# Patient Record
Sex: MALE | Race: WHITE | NOT HISPANIC OR LATINO | Employment: OTHER | URBAN - METROPOLITAN AREA
[De-identification: names, ages, dates, MRNs, and addresses within clinical notes are randomized per-mention and may not be internally consistent; named-entity substitution may affect disease eponyms.]

---

## 2020-07-02 ENCOUNTER — OFFICE VISIT (OUTPATIENT)
Dept: FAMILY MEDICINE | Facility: CLINIC | Age: 45
End: 2020-07-02
Payer: COMMERCIAL

## 2020-07-02 VITALS
DIASTOLIC BLOOD PRESSURE: 83 MMHG | OXYGEN SATURATION: 97 % | TEMPERATURE: 98.4 F | SYSTOLIC BLOOD PRESSURE: 122 MMHG | HEART RATE: 109 BPM

## 2020-07-02 DIAGNOSIS — F43.10 POST TRAUMATIC STRESS DISORDER: ICD-10-CM

## 2020-07-02 DIAGNOSIS — M79.2 NEUROPATHIC PAIN: ICD-10-CM

## 2020-07-02 DIAGNOSIS — E66.01 MORBID OBESITY (H): ICD-10-CM

## 2020-07-02 DIAGNOSIS — M79.662 PAIN OF LEFT LOWER LEG: Primary | ICD-10-CM

## 2020-07-02 DIAGNOSIS — Z89.512 STATUS POST BELOW-KNEE AMPUTATION OF LEFT LOWER EXTREMITY (H): ICD-10-CM

## 2020-07-02 DIAGNOSIS — M25.511 CHRONIC RIGHT SHOULDER PAIN: ICD-10-CM

## 2020-07-02 DIAGNOSIS — G89.29 CHRONIC RIGHT SHOULDER PAIN: ICD-10-CM

## 2020-07-02 DIAGNOSIS — F32.A DEPRESSION, UNSPECIFIED DEPRESSION TYPE: ICD-10-CM

## 2020-07-02 DIAGNOSIS — Z72.0 TOBACCO ABUSE: ICD-10-CM

## 2020-07-02 DIAGNOSIS — Z99.3 WHEELCHAIR BOUND: ICD-10-CM

## 2020-07-02 PROCEDURE — 99203 OFFICE O/P NEW LOW 30 MIN: CPT | Performed by: FAMILY MEDICINE

## 2020-07-02 RX ORDER — IBUPROFEN 800 MG/1
800 TABLET, FILM COATED ORAL EVERY 8 HOURS PRN
Qty: 180 TABLET | Refills: 11 | Status: SHIPPED | OUTPATIENT
Start: 2020-07-02 | End: 2023-04-13

## 2020-07-02 RX ORDER — PREDNISONE 20 MG/1
TABLET ORAL
Qty: 10 TABLET | Refills: 0 | Status: SHIPPED | OUTPATIENT
Start: 2020-07-02 | End: 2023-04-13 | Stop reason: SINTOL

## 2020-07-02 SDOH — HEALTH STABILITY: MENTAL HEALTH: HOW OFTEN DO YOU HAVE A DRINK CONTAINING ALCOHOL?: NEVER

## 2020-07-02 ASSESSMENT — PAIN SCALES - GENERAL: PAINLEVEL: EXTREME PAIN (8)

## 2020-07-02 NOTE — PROGRESS NOTES
Subjective     Damián Waite is a 45 year old male who presents to clinic today for the following health issues:    HPI     Patient is here for left leg pain that started 1 week ago. Sharp constant pain. Red swollen and tingling.  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  Comes in as a new patient.  He reports he had amputation back in 1991.    History of posttraumatic stress disorder, he does use a wheelchair instead of his prosthesis.  He comes in today reports for the past week or so he is been getting more pain, more tender at his prostate is area mostly at the knee level.  He has no fever, no chills, he has no drainage or discharges.  Over 3 years ago he had similar problem where he had to have the  Drained.    He also reports of right shoulder pain, he reports he does use his wheelchair.  He reports stiff tender.  Denies neck pain,  Has no numbness or tingling in his fingers or hand.      Patient Active Problem List   Diagnosis     Post traumatic stress disorder     Neuropathic pain     S/P below knee amputation (H)     Depression     Tobacco abuse     Wheelchair bound     Morbid obesity (H)     Past Surgical History:   Procedure Laterality Date     AMPUTATION BELOW KNEE RT/LT Left 1991       Social History     Tobacco Use     Smoking status: Current Every Day Smoker     Smokeless tobacco: Never Used   Substance Use Topics     Alcohol use: Never     Frequency: Never     History reviewed. No pertinent family history.      Current Outpatient Medications   Medication Sig Dispense Refill     Ibuprofen (ADVIL PO)        ibuprofen (ADVIL/MOTRIN) 800 MG tablet Take 1 tablet (800 mg) by mouth every 8 hours as needed for moderate pain 180 tablet 11     predniSONE (DELTASONE) 20 MG tablet 2 tab daily for 5 days 10 tablet 0     No Known Allergies  No lab results found.     Reviewed and updated as needed this visit by Provider         Review of Systems    Constitutional, HEENT, cardiovascular, pulmonary, GI, , musculoskeletal, neuro, skin, endocrine and psych systems are negative, except as otherwise noted.      Objective    There were no vitals taken for this visit.  There is no height or weight on file to calculate BMI.  Physical Exam   GENERAL: healthy, alert and no distress  Sitting on a wheel chair.  NECK: no adenopathy, no asymmetry, masses, or scars and thyroid normal to palpation  RESP: lungs clear to auscultation - no rales, rhonchi or wheezes  CV: regular rate and rhythm, normal S1 S2, no S3 or S4, no murmur, click or rub, no peripheral edema and peripheral pulses strong  ABDOMEN: soft, nontender, no hepatosplenomegaly, no masses and bowel sounds normal  MS: Left below knee amputations. Stump look clean, no discharges no redness,   Tenderness at behind the knee, skin not warm to touch, no open sores.  SKIN: no suspicious lesions or rashes  PSYCH: mentation appears normal, affect normal/bright  Right shoulder exam, tenderness at the posterior aspect, stiff with range of motion.  Hand and finger exam normal.    Diagnostic Test Results:  Labs reviewed in Epic  Orders Placed This Encounter   Procedures     US Lower Extremity Venous Duplex Bilateral           Assessment & Plan     1. Pain of left lower leg  Discussed with patient could be superficial thrombophlebitis.  At this point he was advised with warm compression.  I will obtain a sonogram.  Start ibuprofen 800 mg 3 times daily in addition prednisone.  - predniSONE (DELTASONE) 20 MG tablet; 2 tab daily for 5 days  Dispense: 10 tablet; Refill: 0  - US Lower Extremity Venous Duplex Bilateral; Future  - ibuprofen (ADVIL/MOTRIN) 800 MG tablet; Take 1 tablet (800 mg) by mouth every 8 hours as needed for moderate pain  Dispense: 180 tablet; Refill: 11    2. Chronic right shoulder pain  Likely tendinitis, capsulitis.  Advised with home exercise, started prednisone, and will take as been prescribed.  -  predniSONE (DELTASONE) 20 MG tablet; 2 tab daily for 5 days  Dispense: 10 tablet; Refill: 0  - ibuprofen (ADVIL/MOTRIN) 800 MG tablet; Take 1 tablet (800 mg) by mouth every 8 hours as needed for moderate pain  Dispense: 180 tablet; Refill: 11    3. Post traumatic stress disorder      4. Neuropathic pain      5. Status post below-knee amputation of left lower extremity (H)      6. Depression, unspecified depression type      7. Tobacco abuse  Advised to quit smoking  8. Wheelchair bound      9. Morbid obesity (H)         Tobacco Cessation:   reports that he has been smoking. He has never used smokeless tobacco.  Tobacco Cessation Action Plan: Self help information given to patient        Patient Instructions       Patient Education     Exercises for Shoulder Flexibility: Back Scratch    Improving your flexibility can reduce pain. Stretching exercises also can help increase your range of pain-free motion. Breathe normally when you exercise. Try to use smooth, fluid movements. Never force a stretch.  Note: Follow any special instructions you are given. If you feel pain, stop the exercise. If the pain continues after stopping, call your healthcare provider.    Stand straight, placing the back of your hand on the side you want to stretch flat against your lower back.    Throw one end of a towel over your shoulder. Grab it behind your back with your other hand.    Pull down gently on the towel with your front arm. Let your back arm slide up as high as is comfortable. You ll feel a stretch in your shoulder. Hold the stretch for a few seconds.    Repeat 3 to 5 times. Build up to holding each stretch for 30 to 60 seconds.  For your safety, check with your healthcare provider before starting an exercise program.  Date Last Reviewed: 3/1/2018    1883-2259 [employer]. [address] [place] [address]. All rights reserved. This information is not intended as a substitute for professional medical care.  Always follow your healthcare professional's instructions.           Patient Education     Exercises for Shoulder Flexibility: Wall Walk    Improving your flexibility can reduce pain. Stretching exercises also can help increase your range of pain-free motion. Breathe normally when you exercise. Use smooth, fluid movements.  Note: Follow any special instructions you are given. If you feel pain, stop the exercise. If the pain continues after stopping, call your healthcare provider:    Stand with your shoulder about 2 feet from the wall.    Raise your arm to shoulder level and gently  walk  your fingers up the wall as high as you can.    Hold for a few seconds. Then walk your fingers back down.    Repeat 3 times. Move closer to the wall as you repeat.    Build up to holding each stretch for 30 seconds.  Caution: Do this stretch only if your healthcare provider recommends it. Don t do it when you are first injured.  Date Last Reviewed: 3/1/2018    2829-5016 [employer]. [address] [place] [address]. All rights reserved. This information is not intended as a substitute for professional medical care. Always follow your healthcare professional's instructions.           Patient Education     Shoulder Exercises    To start, sit in a chair with your feet flat on the floor. Your weight should be slightly forward so that you re balanced evenly on your buttocks. Relax your shoulders and keep your head level. Avoid arching your back or rounding your shoulders. Using a chair with arms may help you keep your balance.    Raise your arms, elbows bent, to shoulder height.    Slowly move your forearms together. Hold for 5 seconds.    Return to starting position. Repeat 5 times.  Date Last Reviewed: 3/1/2018    8529-7942 [employer]. [address] [place] [address]. All rights reserved. This information is not intended as a substitute for professional medical care. Always follow your  healthcare professional's instructions.           Patient Education     Shoulder Squeeze Exercise    To start, sit in a chair with your feet flat on the floor. Shift your weight slightly forward to avoid rounding your back. Relax. Keep your ears, shoulders, and hips aligned:    Raise your arms to shoulder height, elbows bent and palms forward.    Move your arms back, squeezing your shoulder blades together.    Hold for 10 seconds. Return to starting position.     Repeat 5 times.   For your safety, check with your healthcare provider before starting an exercise program.   Date Last Reviewed: 11/1/2017 2000-2019 [employer]. [address] [place] [address]. All rights reserved. This information is not intended as a substitute for professional medical care. Always follow your healthcare professional's instructions.           Patient Education     Shoulder Exercises: Internal Rotation    Strengthening exercises help make your injured shoulder more stable. To warm up, do flexibility (stretching) exercises first. Your healthcare provider will tell you what size hand weights to use for the strengthening exercise below. If you don t have hand weights, try using cans of soup instead:    With knees bent, lie on a firm surface. Using the hand on the same side as your injured shoulder, grasp a weight. Bend that arm to a right angle (90 degrees).    Rest your elbow on the floor.    Keeping your elbow next to your side, lower your forearm toward the floor, away from your body. Don't lower your hand all the way to the floor.    Slowly return your forearm to your side. Repeat.    Work up to 5 to 15 lifts.  Note: Support your head and neck with a pillow.  Date Last Reviewed: 3/1/2018    2647-8798 [employer]. [address] [place] [address]. All rights reserved. This information is not intended as a substitute for professional medical care. Always follow your healthcare  professional's instructions.           Patient Education     Shoulder Exercises: Shoulder Press    This exercise stretches and strengthens your shoulders. Before starting, read through all the instructions. During the exercise, breathe normally and use smooth movements. Stop if you feel any pain. If pain persists, call your healthcare provider.    Hold a ____ pound weight in each hand, elbows at shoulder level, palms facing forward. Arms to the side and slightly forward. Ask your physical therapist how much weight to use.     Raise one arm up until it s almost straight. Hold for a second. Lower the weight, extending the other arm up.    Repeat ____ times with each arm. Do ____ sets ____ times a day.  CAUTION: If you have shoulder problems, consult your healthcare provider before doing this exercise. Keep your head and body still during the exercise. Only your arms should move.   Date Last Reviewed: 11/1/2017 2000-2019 [employer]. [address] [place] [address], [place] [address]. All rights reserved. This information is not intended as a substitute for professional medical care. Always follow your healthcare professional's instructions.               No follow-ups on file.    Marquise Juarez MD  [facility]

## 2020-07-02 NOTE — PATIENT INSTRUCTIONS
Patient Education     Exercises for Shoulder Flexibility: Back Scratch    Improving your flexibility can reduce pain. Stretching exercises also can help increase your range of pain-free motion. Breathe normally when you exercise. Try to use smooth, fluid movements. Never force a stretch.  Note: Follow any special instructions you are given. If you feel pain, stop the exercise. If the pain continues after stopping, call your healthcare provider.    Stand straight, placing the back of your hand on the side you want to stretch flat against your lower back.    Throw one end of a towel over your shoulder. Grab it behind your back with your other hand.    Pull down gently on the towel with your front arm. Let your back arm slide up as high as is comfortable. You ll feel a stretch in your shoulder. Hold the stretch for a few seconds.    Repeat 3 to 5 times. Build up to holding each stretch for 30 to 60 seconds.  For your safety, check with your healthcare provider before starting an exercise program.  Date Last Reviewed: 3/1/2018    6113-0403 [employer] [address] [place] [address]. All rights reserved. This information is not intended as a substitute for professional medical care. Always follow your healthcare professional's instructions.           Patient Education     Exercises for Shoulder Flexibility: Wall Walk    Improving your flexibility can reduce pain. Stretching exercises also can help increase your range of pain-free motion. Breathe normally when you exercise. Use smooth, fluid movements.  Note: Follow any special instructions you are given. If you feel pain, stop the exercise. If the pain continues after stopping, call your healthcare provider:    Stand with your shoulder about 2 feet from the wall.    Raise your arm to shoulder level and gently  walk  your fingers up the wall as high as you can.    Hold for a few seconds. Then walk your fingers back down.    Repeat 3 times. Move  closer to the wall as you repeat.    Build up to holding each stretch for 30 seconds.  Caution: Do this stretch only if your healthcare provider recommends it. Don t do it when you are first injured.  Date Last Reviewed: 3/1/2018    4038-8767 [employer]. [address] [place] [address]. All rights reserved. This information is not intended as a substitute for professional medical care. Always follow your healthcare professional's instructions.           Patient Education     Shoulder Exercises    To start, sit in a chair with your feet flat on the floor. Your weight should be slightly forward so that you re balanced evenly on your buttocks. Relax your shoulders and keep your head level. Avoid arching your back or rounding your shoulders. Using a chair with arms may help you keep your balance.    Raise your arms, elbows bent, to shoulder height.    Slowly move your forearms together. Hold for 5 seconds.    Return to starting position. Repeat 5 times.  Date Last Reviewed: 3/1/2018    [id number] [facility]. [address] [place] [address]. All rights reserved. This information is not intended as a substitute for professional medical care. Always follow your healthcare professional's instructions.           Patient Education     Shoulder Squeeze Exercise    To start, sit in a chair with your feet flat on the floor. Shift your weight slightly forward to avoid rounding your back. Relax. Keep your ears, shoulders, and hips aligned:    Raise your arms to shoulder height, elbows bent and palms forward.    Move your arms back, squeezing your shoulder blades together.    Hold for 10 seconds. Return to starting position.     Repeat 5 times.   For your safety, check with your healthcare provider before starting an exercise program.   Date Last Reviewed: 11/1/2017 2000-2019 [employer]. [address] [place] [address]. All rights reserved. This  information is not intended as a substitute for professional medical care. Always follow your healthcare professional's instructions.           Patient Education     Shoulder Exercises: Internal Rotation    Strengthening exercises help make your injured shoulder more stable. To warm up, do flexibility (stretching) exercises first. Your healthcare provider will tell you what size hand weights to use for the strengthening exercise below. If you don t have hand weights, try using cans of soup instead:    With knees bent, lie on a firm surface. Using the hand on the same side as your injured shoulder, grasp a weight. Bend that arm to a right angle (90 degrees).    Rest your elbow on the floor.    Keeping your elbow next to your side, lower your forearm toward the floor, away from your body. Don't lower your hand all the way to the floor.    Slowly return your forearm to your side. Repeat.    Work up to 5 to 15 lifts.  Note: Support your head and neck with a pillow.  Date Last Reviewed: 3/1/2018    8242-3431 [employer]. [address] [place] [address]. All rights reserved. This information is not intended as a substitute for professional medical care. Always follow your healthcare professional's instructions.           Patient Education     Shoulder Exercises: Shoulder Press    This exercise stretches and strengthens your shoulders. Before starting, read through all the instructions. During the exercise, breathe normally and use smooth movements. Stop if you feel any pain. If pain persists, call your healthcare provider.    Hold a ____ pound weight in each hand, elbows at shoulder level, palms facing forward. Arms to the side and slightly forward. Ask your physical therapist how much weight to use.     Raise one arm up until it s almost straight. Hold for a second. Lower the weight, extending the other arm up.    Repeat ____ times with each arm. Do ____ sets ____ times a day.  CAUTION: If you  have shoulder problems, consult your healthcare provider before doing this exercise. Keep your head and body still during the exercise. Only your arms should move.   Date Last Reviewed: 11/1/2017 2000-2019 [employer]. [address] [place] [address], [place] [address]. All rights reserved. This information is not intended as a substitute for professional medical care. Always follow your healthcare professional's instructions.

## 2020-07-06 ENCOUNTER — TELEPHONE (OUTPATIENT)
Dept: FAMILY MEDICINE | Facility: CLINIC | Age: 45
End: 2020-07-06

## 2020-07-06 ENCOUNTER — ANCILLARY PROCEDURE (OUTPATIENT)
Dept: ULTRASOUND IMAGING | Facility: CLINIC | Age: 45
End: 2020-07-06
Attending: FAMILY MEDICINE
Payer: COMMERCIAL

## 2020-07-06 DIAGNOSIS — M79.662 PAIN OF LEFT LOWER LEG: ICD-10-CM

## 2020-07-06 DIAGNOSIS — M79.662 PAIN OF LEFT LOWER LEG: Primary | ICD-10-CM

## 2020-07-06 DIAGNOSIS — L03.116 CELLULITIS OF LEFT LOWER EXTREMITY: ICD-10-CM

## 2020-07-06 PROCEDURE — 93971 EXTREMITY STUDY: CPT | Mod: LT

## 2020-07-06 RX ORDER — CLINDAMYCIN HCL 300 MG
300 CAPSULE ORAL 3 TIMES DAILY
Qty: 30 CAPSULE | Refills: 0 | Status: SHIPPED | OUTPATIENT
Start: 2020-07-06 | End: 2020-07-16

## 2021-06-06 NOTE — TELEPHONE ENCOUNTER
Call patient regarding his sonogram, was negative for DVT.    He reports still feels warm, tender to touch.  I did send him prescription for clindamycin 1 tablet 3 times daily.  Take with food   Yes

## 2022-02-21 ENCOUNTER — OFFICE VISIT (OUTPATIENT)
Dept: FAMILY MEDICINE | Facility: CLINIC | Age: 47
End: 2022-02-21
Payer: COMMERCIAL

## 2022-02-21 VITALS
BODY MASS INDEX: 35.48 KG/M2 | RESPIRATION RATE: 26 BRPM | HEIGHT: 71 IN | DIASTOLIC BLOOD PRESSURE: 82 MMHG | OXYGEN SATURATION: 98 % | WEIGHT: 253.4 LBS | TEMPERATURE: 98.5 F | HEART RATE: 114 BPM | SYSTOLIC BLOOD PRESSURE: 130 MMHG

## 2022-02-21 DIAGNOSIS — L03.116 CELLULITIS OF LEFT LOWER EXTREMITY: Primary | ICD-10-CM

## 2022-02-21 DIAGNOSIS — Z89.512 STATUS POST BELOW-KNEE AMPUTATION OF LEFT LOWER EXTREMITY (H): ICD-10-CM

## 2022-02-21 PROBLEM — T84.069A: Status: ACTIVE | Noted: 2022-02-21

## 2022-02-21 PROCEDURE — 99213 OFFICE O/P EST LOW 20 MIN: CPT | Performed by: FAMILY MEDICINE

## 2022-02-21 RX ORDER — PREGABALIN 25 MG/1
1 CAPSULE ORAL 3 TIMES DAILY PRN
COMMUNITY
Start: 2022-02-16 | End: 2023-04-13

## 2022-02-21 RX ORDER — DOXYCYCLINE 100 MG/1
100 CAPSULE ORAL 2 TIMES DAILY
Qty: 20 CAPSULE | Refills: 0 | Status: SHIPPED | OUTPATIENT
Start: 2022-02-21 | End: 2023-04-13

## 2022-02-21 ASSESSMENT — PAIN SCALES - GENERAL: PAINLEVEL: SEVERE PAIN (6)

## 2022-02-21 NOTE — PROGRESS NOTES
"  Assessment & Plan     Cellulitis of left lower extremity  Patient reports she was seen by an orthopedic, there was no abscess or skin infection, bone infection.  Advised patient supportive care, started doxycycline.  Advised with supportive care.  - doxycycline hyclate (VIBRAMYCIN) 100 MG capsule; Take 1 capsule (100 mg) by mouth 2 times daily    Status post below-knee amputation of left lower extremity (H)  Patient does wear prosthesis in his left lower leg.  He does follow-up with prosthesis clinic, where he did his leg adjusted.  Continue with Lyrica, was started 3 days ago.  May increase the dose to 50 mg bid.    No follow-ups on file.    Marquise Juarez MD  [facility]    Luisana Steel is a 46 year old who presents for the following health issues  History of Present Illness     Reason for visit:  Today l am here because l have a leg pain.  Symptom onset:  1-2 weeks ago  Symptoms include:  Pain  Symptom intensity:  Mild  Symptom progression:  Staying the same  Had these symptoms before:  Yes  Has tried/received treatment for these symptoms:  No  What makes it worse:  No  What makes it better:  For my leg to be better not hurt no more.    He eats 0-1 servings of fruits and vegetables daily.He consumes 2 sweetened beverage(s) daily.He exercises with enough effort to increase his heart rate 9 or less minutes per day.  He exercises with enough effort to increase his heart rate 3 or less days per week. He is missing 2 dose(s) of medications per week.       Review of Systems   Constitutional, HEENT, cardiovascular, pulmonary, gi and gu systems are negative, except as otherwise noted.      Objective    /82 (BP Location: Right arm, Patient Position: Sitting, Cuff Size: Adult Large)   Pulse 114   Temp 98.5  F (36.9  C)   Resp 26   Ht 1.803 m (5' 11\")   Wt 114.9 kg (253 lb 6.4 oz)   SpO2 98%   BMI 35.34 kg/m    Body mass index is 35.34 kg/m .  Physical Exam   GENERAL: " healthy, alert and no distress  MS: left leg : Amputation below the knee.  Knee stump more red, he does have a clear small blister.  No discharges.    NEURO: Normal strength and tone, mentation intact and speech normal  PSYCH: mentation appears normal, affect normal/bright      Marquise Juarez MD

## 2023-04-13 ENCOUNTER — OFFICE VISIT (OUTPATIENT)
Dept: FAMILY MEDICINE | Facility: CLINIC | Age: 48
End: 2023-04-13
Payer: COMMERCIAL

## 2023-04-13 VITALS
TEMPERATURE: 98.4 F | DIASTOLIC BLOOD PRESSURE: 78 MMHG | SYSTOLIC BLOOD PRESSURE: 127 MMHG | RESPIRATION RATE: 20 BRPM | HEART RATE: 88 BPM | BODY MASS INDEX: 32.2 KG/M2 | HEIGHT: 71 IN | WEIGHT: 230 LBS | OXYGEN SATURATION: 97 %

## 2023-04-13 DIAGNOSIS — G56.01 CARPAL TUNNEL SYNDROME OF RIGHT WRIST: Primary | ICD-10-CM

## 2023-04-13 DIAGNOSIS — I83.811 VARICOSE VEINS OF LOWER EXTREMITY WITH PAIN, RIGHT: ICD-10-CM

## 2023-04-13 DIAGNOSIS — M79.10 MUSCLE PAIN: ICD-10-CM

## 2023-04-13 DIAGNOSIS — Z11.4 SCREENING FOR HIV (HUMAN IMMUNODEFICIENCY VIRUS): ICD-10-CM

## 2023-04-13 DIAGNOSIS — M25.50 MULTIPLE JOINT PAIN: ICD-10-CM

## 2023-04-13 DIAGNOSIS — M77.11 RIGHT TENNIS ELBOW: ICD-10-CM

## 2023-04-13 DIAGNOSIS — Z11.59 NEED FOR HEPATITIS C SCREENING TEST: ICD-10-CM

## 2023-04-13 LAB
BASOPHILS # BLD AUTO: 0 10E3/UL (ref 0–0.2)
BASOPHILS NFR BLD AUTO: 0 %
EOSINOPHIL # BLD AUTO: 0.2 10E3/UL (ref 0–0.7)
EOSINOPHIL NFR BLD AUTO: 2 %
ERYTHROCYTE [DISTWIDTH] IN BLOOD BY AUTOMATED COUNT: 12.7 % (ref 10–15)
ERYTHROCYTE [SEDIMENTATION RATE] IN BLOOD BY WESTERGREN METHOD: 7 MM/HR (ref 0–15)
HCT VFR BLD AUTO: 43.4 % (ref 40–53)
HGB BLD-MCNC: 15 G/DL (ref 13.3–17.7)
HOLD SPECIMEN: NORMAL
LYMPHOCYTES # BLD AUTO: 2.8 10E3/UL (ref 0.8–5.3)
LYMPHOCYTES NFR BLD AUTO: 34 %
MCH RBC QN AUTO: 31.1 PG (ref 26.5–33)
MCHC RBC AUTO-ENTMCNC: 34.6 G/DL (ref 31.5–36.5)
MCV RBC AUTO: 90 FL (ref 78–100)
MONOCYTES # BLD AUTO: 0.7 10E3/UL (ref 0–1.3)
MONOCYTES NFR BLD AUTO: 9 %
NEUTROPHILS # BLD AUTO: 4.5 10E3/UL (ref 1.6–8.3)
NEUTROPHILS NFR BLD AUTO: 55 %
PLATELET # BLD AUTO: 255 10E3/UL (ref 150–450)
RBC # BLD AUTO: 4.82 10E6/UL (ref 4.4–5.9)
WBC # BLD AUTO: 8.1 10E3/UL (ref 4–11)

## 2023-04-13 PROCEDURE — 86803 HEPATITIS C AB TEST: CPT | Performed by: FAMILY MEDICINE

## 2023-04-13 PROCEDURE — 86618 LYME DISEASE ANTIBODY: CPT | Mod: 90 | Performed by: FAMILY MEDICINE

## 2023-04-13 PROCEDURE — 86038 ANTINUCLEAR ANTIBODIES: CPT | Performed by: FAMILY MEDICINE

## 2023-04-13 PROCEDURE — 82607 VITAMIN B-12: CPT | Performed by: FAMILY MEDICINE

## 2023-04-13 PROCEDURE — 82495 ASSAY OF CHROMIUM: CPT | Mod: 90 | Performed by: FAMILY MEDICINE

## 2023-04-13 PROCEDURE — 99215 OFFICE O/P EST HI 40 MIN: CPT | Performed by: FAMILY MEDICINE

## 2023-04-13 PROCEDURE — 82550 ASSAY OF CK (CPK): CPT | Performed by: FAMILY MEDICINE

## 2023-04-13 PROCEDURE — 86431 RHEUMATOID FACTOR QUANT: CPT | Performed by: FAMILY MEDICINE

## 2023-04-13 PROCEDURE — 85652 RBC SED RATE AUTOMATED: CPT | Performed by: FAMILY MEDICINE

## 2023-04-13 PROCEDURE — 87389 HIV-1 AG W/HIV-1&-2 AB AG IA: CPT | Mod: 90 | Performed by: FAMILY MEDICINE

## 2023-04-13 PROCEDURE — 86140 C-REACTIVE PROTEIN: CPT | Performed by: FAMILY MEDICINE

## 2023-04-13 PROCEDURE — 80050 GENERAL HEALTH PANEL: CPT | Performed by: FAMILY MEDICINE

## 2023-04-13 PROCEDURE — 82085 ASSAY OF ALDOLASE: CPT | Mod: 90 | Performed by: FAMILY MEDICINE

## 2023-04-13 PROCEDURE — 82306 VITAMIN D 25 HYDROXY: CPT | Performed by: FAMILY MEDICINE

## 2023-04-13 PROCEDURE — 99000 SPECIMEN HANDLING OFFICE-LAB: CPT | Performed by: FAMILY MEDICINE

## 2023-04-13 PROCEDURE — 83018 HEAVY METAL QUAN EACH NES: CPT | Mod: 90 | Performed by: FAMILY MEDICINE

## 2023-04-13 PROCEDURE — 86200 CCP ANTIBODY: CPT | Performed by: FAMILY MEDICINE

## 2023-04-13 PROCEDURE — 36415 COLL VENOUS BLD VENIPUNCTURE: CPT | Performed by: FAMILY MEDICINE

## 2023-04-13 RX ORDER — CYCLOBENZAPRINE HCL 5 MG
5 TABLET ORAL
Qty: 30 TABLET | Refills: 0 | Status: SHIPPED | OUTPATIENT
Start: 2023-04-13 | End: 2023-05-13

## 2023-04-13 RX ORDER — METHYLPREDNISOLONE 4 MG
TABLET, DOSE PACK ORAL
Qty: 21 TABLET | Refills: 0 | Status: SHIPPED | OUTPATIENT
Start: 2023-04-13 | End: 2023-12-06

## 2023-04-13 RX ORDER — NABUMETONE 500 MG/1
500 TABLET, FILM COATED ORAL 2 TIMES DAILY PRN
Qty: 60 TABLET | Refills: 3 | Status: SHIPPED | OUTPATIENT
Start: 2023-04-13 | End: 2023-12-06

## 2023-04-13 ASSESSMENT — PAIN SCALES - GENERAL: PAINLEVEL: MODERATE PAIN (5)

## 2023-04-13 ASSESSMENT — PATIENT HEALTH QUESTIONNAIRE - PHQ9
10. IF YOU CHECKED OFF ANY PROBLEMS, HOW DIFFICULT HAVE THESE PROBLEMS MADE IT FOR YOU TO DO YOUR WORK, TAKE CARE OF THINGS AT HOME, OR GET ALONG WITH OTHER PEOPLE: NOT DIFFICULT AT ALL
SUM OF ALL RESPONSES TO PHQ QUESTIONS 1-9: 2
SUM OF ALL RESPONSES TO PHQ QUESTIONS 1-9: 2

## 2023-04-13 NOTE — PROGRESS NOTES
Assessment & Plan     Carpal tunnel syndrome of right wrist  Patient reports for the past 5 to 6 months he has been having more joint stiffness, more muscle pain.  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  Using his hand all the time.  He reports he gets more stiffness more weakness on the right-hand.  He has no joint swelling, he has no loss of sensation.  He does work using chemicals to clean cars and detail cars.  Does not wear gloves.  Patient complaining of multiple stiffness in his hand, his wrists, his muscles, he reported does get better if he is not doing anything.  Has been taking vitamin D 50,000 unit once a week.  He has no fever, no chills, no swelling in his joint, no skin rashes.    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  He does use chemicals to clean cars, without using gloves.  Advised patient to wears gloves.  Advised patient supportive care.    He does have symptoms of right carpal tunnel I gave him a cock-up splint to use during the daytime.  Advised patient with home exercise, stretches and exercise and abdominal lower extremities.  In addition, drinks more water.  Continue with vitamin D supplement, 50, 000 units a week    - Wrist/Arm Supplies Order Wrist Brace; Right; with thumb spica  - methylPREDNISolone (MEDROL DOSEPAK) 4 MG tablet therapy pack; Follow Package Directions  - cyclobenzaprine (FLEXERIL) 5 MG tablet; Take 1 tablet (5 mg) by mouth nightly as needed for muscle spasms  - nabumetone (RELAFEN) 500 MG tablet; Take 1 tablet (500 mg) by mouth 2 times daily as needed for moderate pain  - TSH with free T4 reflex; Future  - TSH with free T4 reflex    Multiple joint pain    - CRP, inflammation; Future  - ESR: Erythrocyte sedimentation rate; Future  - CBC with platelets and differential; Future  - Vitamin D Deficiency; Future  - Comprehensive metabolic panel (BMP + Alb, Alk Phos, ALT, AST,  Total. Bili, TP); Future  - Lyme Disease Total Abs Bld with Reflex to Confirm CLIA; Future  - Rheumatoid factor; Future  - Cyclic Citrullinated Peptide Antibody IgG; Future  - Vitamin B12; Future  - Vitamin D Deficiency; Future  - CK total; Future  - methylPREDNISolone (MEDROL DOSEPAK) 4 MG tablet therapy pack; Follow Package Directions  - cyclobenzaprine (FLEXERIL) 5 MG tablet; Take 1 tablet (5 mg) by mouth nightly as needed for muscle spasms  - nabumetone (RELAFEN) 500 MG tablet; Take 1 tablet (500 mg) by mouth 2 times daily as needed for moderate pain  - Mercury, blood; Future  - Cobalt; Future  - Chromium level; Future  - CRP, inflammation  - ESR: Erythrocyte sedimentation rate  - CBC with platelets and differential  - Vitamin D Deficiency  - Comprehensive metabolic panel (BMP + Alb, Alk Phos, ALT, AST, Total. Bili, TP)  - Lyme Disease Total Abs Bld with Reflex to Confirm CLIA  - Rheumatoid factor  - Cyclic Citrullinated Peptide Antibody IgG  - Vitamin B12  - CK total  - Mercury, blood  - Cobalt  - Chromium level    Muscle pain    - CRP, inflammation; Future  - ESR: Erythrocyte sedimentation rate; Future  - CBC with platelets and differential; Future  - Vitamin D Deficiency; Future  - Comprehensive metabolic panel (BMP + Alb, Alk Phos, ALT, AST, Total. Bili, TP); Future  - Lyme Disease Total Abs Bld with Reflex to Confirm CLIA; Future  - Rheumatoid factor; Future  - Cyclic Citrullinated Peptide Antibody IgG; Future  - Vitamin B12; Future  - Vitamin D Deficiency; Future  - CK total; Future  - methylPREDNISolone (MEDROL DOSEPAK) 4 MG tablet therapy pack; Follow Package Directions  - cyclobenzaprine (FLEXERIL) 5 MG tablet; Take 1 tablet (5 mg) by mouth nightly as needed for muscle spasms  - nabumetone (RELAFEN) 500 MG tablet; Take 1 tablet (500 mg) by mouth 2 times daily as needed for moderate pain  - TSH with free T4 reflex; Future  - Lead Venous Blood Confirm; Future  - Mercury, blood; Future  - Cobalt; Future  -  "Chromium level; Future  - CRP, inflammation  - ESR: Erythrocyte sedimentation rate  - CBC with platelets and differential  - Vitamin D Deficiency  - Comprehensive metabolic panel (BMP + Alb, Alk Phos, ALT, AST, Total. Bili, TP)  - Lyme Disease Total Abs Bld with Reflex to Confirm CLIA  - Rheumatoid factor  - Cyclic Citrullinated Peptide Antibody IgG  - Vitamin B12  - CK total  - TSH with free T4 reflex  - Lead Venous Blood Confirm  - Mercury, blood  - Cobalt  - Chromium level    Screening for HIV (human immunodeficiency virus)  screening    Need for hepatitis C screening test  screening    Varicose veins of lower extremity with pain, right    - Compression Sleeve/Stocking Order for DME - ONLY FOR DME    Right tennis elbow  Right hand cockup splint  Advised with home daily stretches.        Over 45 minutes spent reviewing chart, reviewing test results, talking with and examining patient, formulating plan, and documentation on the day of the encounter.       Nicotine/Tobacco Cessation:  He reports that he has been smoking cigarettes. He has a 1.25 pack-year smoking history. He has been exposed to tobacco smoke. He has never used smokeless tobacco.  Nicotine/Tobacco Cessation Plan:   advised pt to quite, not ready      BMI:   Estimated body mass index is 32.08 kg/m  as calculated from the following:    Height as of this encounter: 1.803 m (5' 11\").    Weight as of this encounter: 104.3 kg (230 lb).   Weight management plan: Discussed healthy diet and exercise guidelines    Work on weight loss  Regular exercise      Luisana Steel is a 48 year old, presenting for the following health issues:  Generalized Body Aches  Patient reports for the past 5 to 6 months he has been having more joint stiffness, more muscle pain.  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  Using his hand all the time.  He reports he gets more stiffness more weakness on the right-hand.  He has no joint swelling, he has no loss of " "sensation.  He does work using chemicals to clean cars and detail cars.  Does not wear gloves.  Patient complaining of multiple stiffness in his hand, his wrists, his muscle, he reported does get better if he is not doing anything.  Has been taking vitamin D 50,000 unit once a week.  He has no fever, no chills, no swelling in his joint, no skin rashes.      4/13/2023     4:25 PM   Additional Questions   Roomed by Zhanna Cervantse   Accompanied by Daughter         4/13/2023     4:25 PM   Patient Reported Additional Medications   Patient reports taking the following new medications No     History of Present Illness       Reason for visit:  Pain in my bones  Symptom onset:  More than a month  Symptoms include:  Pain the the bones  Symptom intensity:  Moderate  Symptom progression:  Staying the same  Had these symptoms before:  No  What makes it worse:  No  What makes it better:  Family and being healthy    He eats 4 or more servings of fruits and vegetables daily.He consumes 3 sweetened beverage(s) daily.He exercises with enough effort to increase his heart rate 20 to 29 minutes per day.  He exercises with enough effort to increase his heart rate 3 or less days per week.   He is taking medications regularly.    Today's PHQ-9         PHQ-9 Total Score: 2    PHQ-9 Q9 Thoughts of better off dead/self-harm past 2 weeks :   Not at all    How difficult have these problems made it for you to do your work, take care of things at home, or get along with other people: Not difficult at all       Review of Systems   Constitutional, HEENT, cardiovascular, pulmonary, GI, , musculoskeletal, neuro, skin, endocrine and psych systems are negative, except as otherwise noted.      Objective    /78 (BP Location: Right arm, Patient Position: Chair, Cuff Size: Adult Large)   Pulse 88   Temp 98.4  F (36.9  C) (Tympanic)   Resp 20   Ht 1.803 m (5' 11\")   Wt 104.3 kg (230 lb)   SpO2 97%   BMI 32.08 kg/m    Body mass index is 32.08 " kg/m .  Physical Exam   GENERAL: healthy, alert and no distress  NECK: no adenopathy, no asymmetry, masses, or scars and thyroid normal to palpation  RESP: lungs clear to auscultation - no rales, rhonchi or wheezes  CV: regular rate and rhythm, normal S1 S2, no S3 or S4, no murmur, click or rub, no peripheral edema and peripheral pulses strong  ABDOMEN: soft, nontender, no hepatosplenomegaly, no masses and bowel sounds normal  MS: no gross musculoskeletal defects noted, no edema  No joint swelling or tenderness.  Right lower leg: varicose veins.  No skin rashes.  SKIN: no suspicious lesions or rashes  NEURO: Normal strength and tone, mentation intact and speech normal  PSYCH: mentation appears normal, affect normal/bright    Orders Placed This Encounter   Procedures     REVIEW OF HEALTH MAINTENANCE PROTOCOL ORDERS     CRP, inflammation     ESR: Erythrocyte sedimentation rate     Vitamin D Deficiency     Comprehensive metabolic panel (BMP + Alb, Alk Phos, ALT, AST, Total. Bili, TP)     Lyme Disease Total Abs Bld with Reflex to Confirm CLIA     Rheumatoid factor     Cyclic Citrullinated Peptide Antibody IgG     Vitamin B12     Vitamin D Deficiency     CK total     TSH with free T4 reflex     Extra Tube     Lead Venous Blood Confirm     Mercury, blood     Cobalt     Chromium level     CBC with platelets and differential     Extra Red Top Tube     Extra Green Top (Lithium Heparin) Tube     Extra Purple Top Tube     Compression Sleeve/Stocking Order for DME - ONLY FOR DME     Wrist/Arm Supplies Order Wrist Brace; Right; non-thumb spica     CBC with platelets and differential           Marquise Juarez MD

## 2023-04-14 LAB
ALBUMIN SERPL BCG-MCNC: 4.3 G/DL (ref 3.5–5.2)
ALP SERPL-CCNC: 66 U/L (ref 40–129)
ALT SERPL W P-5'-P-CCNC: 24 U/L (ref 10–50)
ANION GAP SERPL CALCULATED.3IONS-SCNC: 11 MMOL/L (ref 7–15)
AST SERPL W P-5'-P-CCNC: 29 U/L (ref 10–50)
BILIRUB SERPL-MCNC: 0.3 MG/DL
BUN SERPL-MCNC: 17.3 MG/DL (ref 6–20)
CALCIUM SERPL-MCNC: 9.4 MG/DL (ref 8.6–10)
CHLORIDE SERPL-SCNC: 105 MMOL/L (ref 98–107)
CK SERPL-CCNC: 239 U/L (ref 39–308)
CREAT SERPL-MCNC: 0.94 MG/DL (ref 0.67–1.17)
CRP SERPL-MCNC: <3 MG/L
DEPRECATED HCO3 PLAS-SCNC: 23 MMOL/L (ref 22–29)
GFR SERPL CREATININE-BSD FRML MDRD: >90 ML/MIN/1.73M2
GLUCOSE SERPL-MCNC: 77 MG/DL (ref 70–99)
POTASSIUM SERPL-SCNC: 4.2 MMOL/L (ref 3.4–5.3)
PROT SERPL-MCNC: 6.9 G/DL (ref 6.4–8.3)
SODIUM SERPL-SCNC: 139 MMOL/L (ref 136–145)
TSH SERPL DL<=0.005 MIU/L-ACNC: 0.68 UIU/ML (ref 0.3–4.2)
VIT B12 SERPL-MCNC: 233 PG/ML (ref 232–1245)

## 2023-04-15 LAB — ALDOLASE SERPL-CCNC: 3.5 U/L

## 2023-04-16 LAB
COBALT SERPL-MCNC: <1 UG/L
CR SERPL-MCNC: <1 UG/L

## 2023-04-17 ENCOUNTER — TELEPHONE (OUTPATIENT)
Dept: FAMILY MEDICINE | Facility: CLINIC | Age: 48
End: 2023-04-17
Payer: COMMERCIAL

## 2023-04-17 DIAGNOSIS — M79.10 MUSCLE PAIN: Primary | ICD-10-CM

## 2023-04-17 DIAGNOSIS — M25.50 MULTIPLE JOINT PAIN: ICD-10-CM

## 2023-04-17 LAB
ANA SER QL IF: NEGATIVE
B BURGDOR IGG+IGM SER QL: 0.19
CCP AB SER IA-ACNC: 2.2 U/ML
DEPRECATED CALCIDIOL+CALCIFEROL SERPL-MC: 27 UG/L (ref 20–75)
HCV AB SERPL QL IA: NONREACTIVE
HIV 1+2 AB+HIV1 P24 AG SERPL QL IA: NONREACTIVE
RHEUMATOID FACT SER NEPH-ACNC: <7 IU/ML

## 2023-04-17 NOTE — TELEPHONE ENCOUNTER
----- Message from Marquise Juarez MD sent at 4/17/2023  5:10 PM CDT -----  Please call patient with his results.  All his labs come back normal.  No sign of muscle injury, no sign of inflammation, no sign of joint inflammation.  No sign of rheumatoid arthritis.    His vitamin D, 27.  Have patient continue with his vitamin D, 1 tablet every week.  Otherwise, no inflammation, no infection, rheumatoid disease.  In addition his metals screen came back normal.    Advised patient supportive care.  Advised patient to wear gloves every time he is working with chemicals, to prevent dryness in his hand, and elastic shocks and lotions hand with Eucerin or Aveeno locations.    He may consider to PT to help with his muscle pain.    thanks

## 2023-04-17 NOTE — TELEPHONE ENCOUNTER
Attempt #1 to call patient.     RN used interpretor services to leave VM    RN left voicemail and requested return call to [facility] at [phone].     Chinyere Bonds RN, BSN  [facility]: [place]

## 2023-04-19 NOTE — TELEPHONE ENCOUNTER
RN called patient/family and relayed provider's message. Patient/family verbalized understanding.     RN used interpretor services to relay message.    Chinyere Bonds RN, BSN  [facility]: [place]

## 2023-11-16 ENCOUNTER — TELEPHONE (OUTPATIENT)
Dept: NURSING | Facility: CLINIC | Age: 48
End: 2023-11-16
Payer: COMMERCIAL

## 2023-11-16 NOTE — TELEPHONE ENCOUNTER
Patient's daughter calling with her Dad. Writer was trying to get an  on the phone and the daughter hung up. No triage.     KAMALJIT STRANGE RN

## 2023-12-06 ENCOUNTER — OFFICE VISIT (OUTPATIENT)
Dept: FAMILY MEDICINE | Facility: CLINIC | Age: 48
End: 2023-12-06
Payer: COMMERCIAL

## 2023-12-06 VITALS
TEMPERATURE: 98 F | HEIGHT: 68 IN | RESPIRATION RATE: 26 BRPM | SYSTOLIC BLOOD PRESSURE: 132 MMHG | DIASTOLIC BLOOD PRESSURE: 87 MMHG | BODY MASS INDEX: 36.37 KG/M2 | HEART RATE: 102 BPM | WEIGHT: 240 LBS | OXYGEN SATURATION: 97 %

## 2023-12-06 DIAGNOSIS — Z72.0 TOBACCO ABUSE: ICD-10-CM

## 2023-12-06 DIAGNOSIS — S83.101A KNEE SUBLUXATION, RIGHT, INITIAL ENCOUNTER: Primary | ICD-10-CM

## 2023-12-06 DIAGNOSIS — Z89.512 STATUS POST BELOW-KNEE AMPUTATION OF LEFT LOWER EXTREMITY (H): ICD-10-CM

## 2023-12-06 DIAGNOSIS — M79.10 MUSCLE PAIN: ICD-10-CM

## 2023-12-06 DIAGNOSIS — E66.01 MORBID OBESITY (H): ICD-10-CM

## 2023-12-06 PROCEDURE — 99214 OFFICE O/P EST MOD 30 MIN: CPT | Mod: 25 | Performed by: FAMILY MEDICINE

## 2023-12-06 PROCEDURE — 90686 IIV4 VACC NO PRSV 0.5 ML IM: CPT | Performed by: FAMILY MEDICINE

## 2023-12-06 PROCEDURE — 90471 IMMUNIZATION ADMIN: CPT | Performed by: FAMILY MEDICINE

## 2023-12-06 RX ORDER — DICLOFENAC SODIUM 75 MG/1
75 TABLET, DELAYED RELEASE ORAL 2 TIMES DAILY PRN
Qty: 60 TABLET | Refills: 1 | Status: SHIPPED | OUTPATIENT
Start: 2023-12-06 | End: 2024-02-08

## 2023-12-06 RX ORDER — METHYLPREDNISOLONE 4 MG
TABLET, DOSE PACK ORAL
Qty: 21 TABLET | Refills: 0 | Status: SHIPPED | OUTPATIENT
Start: 2023-12-06

## 2023-12-06 RX ORDER — PHENTERMINE HYDROCHLORIDE 37.5 MG/1
37.5 TABLET ORAL
Qty: 30 TABLET | Refills: 0 | Status: SHIPPED | OUTPATIENT
Start: 2023-12-06

## 2023-12-06 RX ORDER — DICLOFENAC SODIUM 75 MG/1
75 TABLET, DELAYED RELEASE ORAL 2 TIMES DAILY PRN
Qty: 60 TABLET | Refills: 1 | Status: SHIPPED | OUTPATIENT
Start: 2023-12-06 | End: 2023-12-06

## 2023-12-06 RX ORDER — CAPSAICIN 0.025 %
CREAM (GRAM) TOPICAL
Qty: 120 G | Refills: 0 | Status: SHIPPED | OUTPATIENT
Start: 2023-12-06

## 2023-12-06 ASSESSMENT — PAIN SCALES - GENERAL: PAINLEVEL: EXTREME PAIN (8)

## 2023-12-06 NOTE — PROGRESS NOTES
"  Assessment & Plan     Knee subluxation, right, initial encounter  Home daily stretches and exercises.  RICE therapy, Diclofenac bid as needed  Knee brace,  Declined PT today.  - Knee Supplies Order Knee Sleeve/Brace; Right; Closed    Status post below-knee amputation of left lower extremity (H)  He does have a small contusion at the distal portion of his stump, no sign of infection, no bleeding and no injuries.    Patient reports he had an x-ray over a month ago,was normal.  RICE therapy, avoid over using his Prosthetic until  has no pain.  - capsaicin (ZOSTRIX) 0.025 % external cream; Apply to left lower leg qid as needed    Morbid obese:  Discussed diet, exercise, increase physical activity.  Avoid late meals.  - phentermine (ADIPEX-P) 37.5 MG tablet; Take 1 tablet (37.5 mg) by mouth every morning (before breakfast)    Tobacco abuse  Advised pt to quite , not ready    Muscle pain    - methylPREDNISolone (MEDROL DOSEPAK) 4 MG tablet therapy pack; Follow Package Directions    BMI:   Estimated body mass index is 36.16 kg/m  as calculated from the following:    Height as of this encounter: 1.735 m (5' 8.31\").    Weight as of this encounter: 108.9 kg (240 lb).   Weight management plan: Discussed healthy diet and exercise guidelines      Luisana Steel is a 48 year old, presenting for the following health issues:  Musculoskeletal Problem (Pain in bilateral leg)    Patient with history of below the knee amputation, left leg, he does wear prosthetic, he report it is a new unit, for the past 6 months.  He reports that he has been having more stiffness, more pain distal to the area he has no redness, no open wound, pain mostly at the bone area being more tender.    He had a normal x-ray.    Right knee has been more stiff, with grinding under the kneecap, no swelling, no recent injury.    Patient does smoke, has been gaining weight, has been having hard time losing weight        12/6/2023      ,1:02 PM   Additional " "Questions   Roomed by Zhanna JULIO MCFADDEN   Accompanied by Daughter         12/6/2023     1:02 PM   Patient Reported Additional Medications   Patient reports taking the following new medications None       History of Present Illness       Reason for visit:  Both of my legs  Symptom onset:  More than a month  Symptoms include:  Pain, burning, numbness  Symptom intensity:  Severe  Symptom progression:  Worsening    He eats 4 or more servings of fruits and vegetables daily.He consumes 8 sweetened beverage(s) daily.He exercises with enough effort to increase his heart rate 20 to 29 minutes per day.  He exercises with enough effort to increase his heart rate 4 days per week.   He is taking medications regularly.         Review of Systems   Constitutional, HEENT, cardiovascular, pulmonary, GI, , musculoskeletal, neuro, skin, endocrine and psych systems are negative, except as otherwise noted.      Objective    /87 (BP Location: Right arm, Patient Position: Chair, Cuff Size: Adult Large)   Pulse 102   Temp 98  F (36.7  C) (Oral)   Resp 26   Ht 1.735 m (5' 8.31\")   Wt 108.9 kg (240 lb)   SpO2 97%   BMI 36.16 kg/m    Body mass index is 36.16 kg/m .  Physical Exam   GENERAL: healthy, alert and no distress  MS: left leg:  Below-knee amputation.  Stump looks, skin looks well,  no open wound, no signs of infection.  He has minimal tenderness at the bony structure.  No sign of injury.  Right knee crepitation, stiffness with full flexion, crepitation under the kneecap.  SKIN: no suspicious lesions or rashes  PSYCH: mentation appears normal, affect normal/bright    Orders Placed This Encounter   Procedures    INFLUENZA VACCINE >6 MONTHS (AFLURIA/FLUZONE)    Knee Supplies Order Knee Sleeve/Brace; Right; Closed            Marquise Juarez MD              "

## 2024-02-08 ENCOUNTER — OFFICE VISIT (OUTPATIENT)
Dept: FAMILY MEDICINE | Facility: CLINIC | Age: 49
End: 2024-02-08
Payer: COMMERCIAL

## 2024-02-08 VITALS
DIASTOLIC BLOOD PRESSURE: 81 MMHG | HEART RATE: 106 BPM | HEIGHT: 68 IN | TEMPERATURE: 98 F | OXYGEN SATURATION: 99 % | BODY MASS INDEX: 37.44 KG/M2 | SYSTOLIC BLOOD PRESSURE: 121 MMHG | RESPIRATION RATE: 26 BRPM | WEIGHT: 247 LBS

## 2024-02-08 DIAGNOSIS — Z89.512 STATUS POST BELOW-KNEE AMPUTATION OF LEFT LOWER EXTREMITY (H): Primary | ICD-10-CM

## 2024-02-08 PROCEDURE — 99213 OFFICE O/P EST LOW 20 MIN: CPT | Performed by: FAMILY MEDICINE

## 2024-02-08 RX ORDER — DICLOFENAC SODIUM 75 MG/1
75 TABLET, DELAYED RELEASE ORAL 2 TIMES DAILY PRN
Qty: 60 TABLET | Refills: 1 | Status: SHIPPED | OUTPATIENT
Start: 2024-02-08

## 2024-02-08 RX ORDER — LIDOCAINE 50 MG/G
OINTMENT TOPICAL 3 TIMES DAILY PRN
Qty: 240 G | Refills: 3 | Status: SHIPPED | OUTPATIENT
Start: 2024-02-08

## 2024-02-08 ASSESSMENT — PAIN SCALES - GENERAL: PAINLEVEL: SEVERE PAIN (6)

## 2024-02-08 NOTE — PROGRESS NOTES
Assessment & Plan     Status post below-knee amputation of left lower extremity (H) pain  Patient continues to have pain at the stump site, in addition,  to tenderness in the bone area.  This recurrent issue.  He has no injury, he had minimal swelling, no sign of infection.  He reports his current prosthesis leg, it has been over 6 months old.    Discussed with the patient I do not see any sign of injury, no sign of infection.  Previously, had an x-ray which was negative.  I did recommend for the patient to follow-up with his prosthesis team, for an adjusting to his stump, or try to use a different material to help adjusting and fix his leg.      RICE therapy  Discussed to work on his weight .  - calcium carbonate (OS-CALVIN) 1500 (600 Ca) MG tablet; Take 1 tablet (600 mg) by mouth 2 times daily (with meals)  - lidocaine (XYLOCAINE) 5 % external ointment; Apply topically 3 times daily as needed for moderate pain (apply to left stump)  - diclofenac (VOLTAREN) 1 % topical gel; Apply 2 gm to left leg qid as needed  - diclofenac (VOLTAREN) 75 MG EC tablet; Take 1 tablet (75 mg) by mouth 2 times daily as needed for moderate pain    Luisana Steel is a 48 year old, presenting for the following health issues:  No chief complaint on file.        2/8/2024     2:09 PM   Additional Questions   Roomed by Zhanna KIM CMA   Accompanied by Daughter         2/8/2024     2:09 PM   Patient Reported Additional Medications   Patient reports taking the following new medications None     History of Present Illness       Reason for visit:  My left legs hurts.    He eats 4 or more servings of fruits and vegetables daily.He consumes 7 sweetened beverage(s) daily.He exercises with enough effort to increase his heart rate 20 to 29 minutes per day.  He exercises with enough effort to increase his heart rate 3 or less days per week.   He is taking medications regularly.         Review of Systems  Constitutional, HEENT, cardiovascular,  "pulmonary, gi and gu systems are negative, except as otherwise noted.      Objective    /81 (BP Location: Right arm, Patient Position: Chair, Cuff Size: Adult Large)   Pulse 106   Temp 98  F (36.7  C) (Oral)   Resp 26   Ht 1.727 m (5' 8\")   Wt 112 kg (247 lb)   SpO2 99%   BMI 37.56 kg/m    Body mass index is 37.56 kg/m .  Physical Exam   GENERAL: alert and no distress  MS: left leg below knee amputations  Stump, red, not warm to touch , no sign of infections, none tender  Above stump side, mild tenderness at bone area.  SKIN: no suspicious lesions or rashes  PSYCH: mentation appears normal, affect normal/bright          Signed Electronically by: Marquise Juarez MD    "

## 2024-09-12 ENCOUNTER — APPOINTMENT (OUTPATIENT)
Dept: URBAN - METROPOLITAN AREA CLINIC 252 | Age: 49
Setting detail: DERMATOLOGY
End: 2024-09-12

## 2024-09-12 VITALS — WEIGHT: 240 LBS | HEIGHT: 68 IN

## 2024-09-12 DIAGNOSIS — L50.8 OTHER URTICARIA: ICD-10-CM

## 2024-09-12 PROBLEM — L50.1 IDIOPATHIC URTICARIA: Status: ACTIVE | Noted: 2024-09-12

## 2024-09-12 PROCEDURE — OTHER VENIPUNCTURE: OTHER

## 2024-09-12 PROCEDURE — OTHER ORDER TESTS: OTHER

## 2024-09-12 PROCEDURE — 36415 COLL VENOUS BLD VENIPUNCTURE: CPT

## 2024-09-12 PROCEDURE — OTHER PRESCRIPTION: OTHER

## 2024-09-12 PROCEDURE — OTHER COUNSELING: OTHER

## 2024-09-12 PROCEDURE — 99204 OFFICE O/P NEW MOD 45 MIN: CPT

## 2024-09-12 RX ORDER — CETIRIZINE HYDROCHLORIDE 10 MG/1
20MG TABLET, FILM COATED ORAL BID
Qty: 120 | Refills: 5 | Status: ERX | COMMUNITY
Start: 2024-09-12

## 2024-09-12 RX ORDER — FAMOTIDINE 20 MG/1
20MG TABLET, FILM COATED ORAL BID
Qty: 60 | Refills: 5 | Status: ERX | COMMUNITY
Start: 2024-09-12

## 2024-09-12 RX ORDER — MONTELUKAST SODIUM 10 MG/1
10MG TABLET ORAL QHS
Qty: 30 | Refills: 5 | Status: ERX | COMMUNITY
Start: 2024-09-12

## 2024-09-12 ASSESSMENT — LOCATION SIMPLE DESCRIPTION DERM
LOCATION SIMPLE: RIGHT UPPER BACK
LOCATION SIMPLE: LEFT ELBOW

## 2024-09-12 ASSESSMENT — LOCATION ZONE DERM
LOCATION ZONE: ARM
LOCATION ZONE: TRUNK

## 2024-09-12 ASSESSMENT — LOCATION DETAILED DESCRIPTION DERM
LOCATION DETAILED: RIGHT INFERIOR MEDIAL UPPER BACK
LOCATION DETAILED: LEFT ANTECUBITAL SKIN

## 2024-09-12 NOTE — PROCEDURE: COUNSELING
Detail Level: Generalized
Patient Specific Counseling (Will Not Stick From Patient To Patient): - Discussed chronic urticaria in extensive detail.\\n- Discussed cause is largely unknown. \\n- Discussed approximately 30% of those which chronic urticaria go into remission within a year. \\n- Discussed the following regimen:\\n- Take over-the-counter cetirizine 10mg tablets, 2 PO BID. Discussed that some experience sedation at higher doses like this. If any sedation is experienced d/c and call clinic.\\n- Take famotidine 20mg twice per day. \\n- Take montelukast 10mg once every night. Discussed the association with depression or suicidal thoughts with montelukast.\\n -  Return to clinic if not itch-free within 2 weeks.  If itch-free, take regimen for 2 weeks beyond being itch free, then begin to taper to lowest effective dose. Begin by removing 1 pill once every 3 days beginning with the montelukast, then the famotidine, then the cetirizine until itch returns (then go back to previous effective dose) or until you are down to 1 cetirizine 10mg once every night.  \\n- If the regimen fails, there are several more options available (oral dapsone, hydroxychloroquine, Xolair, Dupixent, methotrexate).\\n- Patient expressed understanding.

## 2024-09-12 NOTE — PROCEDURE: ORDER TESTS
Lab Facility: [id number]
Performing Laboratory: [id number]
Bill For Surgical Tray: no
Billing Type: Third-Party Bill
Expected Date Of Service: 09/12/2024

## 2024-09-12 NOTE — PROCEDURE: VENIPUNCTURE
Number Of Tubes Drawn: 2
Bill 18644 For Specimen Handling/Conveyance To Laboratory?: no
Venipuncture Paragraph: An alcohol pad was applied to the venipuncture site. Venipuncture was performed using a butterfly needle. Pressure and a bandaid was applied to the site. No complications were noted.
Detail Level: None

## 2024-09-12 NOTE — HPI: RASH
How Severe Is Your Rash?: severe
Is This A New Presentation, Or A Follow-Up?: Rash
Additional History: He reports there is no place that is not itchy. Itch has gotten worse. He is following a diet to lose weight and taking injections for this as well for weight loss. He denies any weight loss fever or chills.  However he reports feeling wart at times.    He has not tried anything.  He smokes 2-3 cigarettes per day for 20 years. Has a pcp and last seen them 1 month ago.  When he takes a shower he “bursts with itch”. Temperature is irrelevant.  Sweating provokes itch as well. Itch never resolves. Worse at night.  Denies history of depression. The patient’s problem is exacerbating and not adequately controlled.

## 2024-09-26 ENCOUNTER — APPOINTMENT (OUTPATIENT)
Dept: URBAN - METROPOLITAN AREA CLINIC 252 | Age: 49
Setting detail: DERMATOLOGY
End: 2024-09-27

## 2024-09-26 ENCOUNTER — RX ONLY (RX ONLY)
Age: 49
End: 2024-09-26

## 2024-09-26 VITALS — HEIGHT: 68 IN | WEIGHT: 240 LBS

## 2024-09-26 DIAGNOSIS — L50.8 OTHER URTICARIA: ICD-10-CM

## 2024-09-26 PROBLEM — L50.1 IDIOPATHIC URTICARIA: Status: ACTIVE | Noted: 2024-09-26

## 2024-09-26 PROCEDURE — OTHER PRESCRIPTION: OTHER

## 2024-09-26 PROCEDURE — 99214 OFFICE O/P EST MOD 30 MIN: CPT

## 2024-09-26 PROCEDURE — OTHER COUNSELING: OTHER

## 2024-09-26 RX ORDER — LEVOCETIRIZINE DIHYDROCHLORIDE 5 MG/1
TABLET, FILM COATED ORAL
Qty: 360 | Refills: 3 | Status: ERX | COMMUNITY
Start: 2024-09-26

## 2024-09-26 RX ORDER — PREDNISONE 20 MG/1
TABLET ORAL
Qty: 24 | Refills: 0 | Status: ERX | COMMUNITY
Start: 2024-09-26

## 2024-09-26 RX ORDER — PREDNISONE 20 MG/1
TABLET ORAL
Qty: 26 | Refills: 0 | Status: ERX

## 2024-09-26 ASSESSMENT — LOCATION ZONE DERM: LOCATION ZONE: TRUNK

## 2024-09-26 ASSESSMENT — LOCATION DETAILED DESCRIPTION DERM: LOCATION DETAILED: RIGHT MID-UPPER BACK

## 2024-09-26 ASSESSMENT — LOCATION SIMPLE DESCRIPTION DERM: LOCATION SIMPLE: RIGHT UPPER BACK

## 2024-09-26 NOTE — PROCEDURE: COUNSELING
Detail Level: Generalized
Patient Specific Counseling (Will Not Stick From Patient To Patient): - Advised patient to stop taking famotidine and montelukast. Patient to start levocetirizine and prednisone.\\n- If this regimen fails would plan to diagnose neuropathic pruritus over urticaria. Would plan to treat with Gabapentin. \\n- If the plan discussed today is effective would plan to start dapsone and hydroxychloroquine compounded medication.\\n- Recommended patient keep track of his temperature when he is feeling feverish. \\n- Will order a PA and lateral chest x-ray to [facility] in [place]. Patient given [facility] information for scheduling.

## 2024-09-26 NOTE — HPI: RASH
How Severe Is Your Rash?: moderate
Is This A New Presentation, Or A Follow-Up?: Rash
Additional History: CBC with differential Complete Metabolic Panel and tsh was normal. Started famotidine and montelukast and no better. He tried cetirizine 20mg bid and this caused sedation so he stopped after 2 days. Itch and warmth is worse at night and with shower.  He reports feeling hot at night but hasnt taken his temperature.

## 2024-09-30 ENCOUNTER — APPOINTMENT (OUTPATIENT)
Dept: URBAN - METROPOLITAN AREA CLINIC 252 | Age: 49
Setting detail: DERMATOLOGY
End: 2024-09-30

## 2024-12-23 ENCOUNTER — TRANSCRIBE ORDERS (OUTPATIENT)
Dept: OTHER | Age: 49
End: 2024-12-23

## 2024-12-23 DIAGNOSIS — E78.5 HYPERLIPIDEMIA, UNSPECIFIED: Primary | ICD-10-CM

## 2025-01-08 ENCOUNTER — NURSE TRIAGE (OUTPATIENT)
Dept: FAMILY MEDICINE | Facility: CLINIC | Age: 50
End: 2025-01-08
Payer: COMMERCIAL

## 2025-01-08 NOTE — TELEPHONE ENCOUNTER
Daughter calling, patient present     Reporting that patient has been vomiting everything he eats for 3 weeks. He hasn't been able to keep any food down in 3 weeks. He also complains of intermittent stomach pain and headaches. He will drink water and liquids but will also vomit that right after. He is urinating normally and last BM was 2 days ago.  The vomit is clear or the food he ate. She said patient is talking and otherwise acting normal.     Recommended patient be seen now in ED/[facility]. Daughter verbalized understanding.    Reason for Disposition   SEVERE vomiting (e.g., 6 or more times/day)  (Exception: Patient sounds well, is drinking liquids, does not sound dehydrated, and vomiting has lasted less than 24 hours.)    Additional Information   Negative: Shock suspected (e.g., cold/pale/clammy skin, too weak to stand, low BP, rapid pulse)   Negative: Difficult to awaken or acting confused (e.g., disoriented, slurred speech)   Negative: Sounds like a life-threatening emergency to the triager   Negative: Vomiting occurs only while coughing   Negative: Pregnant < 20 Weeks and nausea/vomiting began in early pregnancy (i.e., 4-8 weeks pregnant)   Negative: Chest pain   Negative: Headache is main symptom   Negative: Vomiting red blood or black (coffee ground) material   Negative: Vomiting and hernia is more painful or swollen than usual   Negative: Recent head injury (within 3 days)   Negative: Recent abdominal injury (within 7 days)   Negative: Insulin-dependent diabetes and glucose > 240 mg/dL (13 mmol/L)   Negative: Severe pain in one eye    Protocols used: Vomiting-A-OH